# Patient Record
Sex: MALE | Race: WHITE | NOT HISPANIC OR LATINO | URBAN - METROPOLITAN AREA
[De-identification: names, ages, dates, MRNs, and addresses within clinical notes are randomized per-mention and may not be internally consistent; named-entity substitution may affect disease eponyms.]

---

## 2022-03-09 ENCOUNTER — EMERGENCY (EMERGENCY)
Facility: HOSPITAL | Age: 67
LOS: 1 days | Discharge: HOME | End: 2022-03-09
Attending: EMERGENCY MEDICINE | Admitting: EMERGENCY MEDICINE
Payer: MEDICARE

## 2022-03-09 VITALS
HEART RATE: 117 BPM | HEIGHT: 73 IN | DIASTOLIC BLOOD PRESSURE: 78 MMHG | RESPIRATION RATE: 16 BRPM | OXYGEN SATURATION: 97 % | SYSTOLIC BLOOD PRESSURE: 126 MMHG | TEMPERATURE: 98 F | WEIGHT: 266.98 LBS

## 2022-03-09 VITALS
DIASTOLIC BLOOD PRESSURE: 77 MMHG | TEMPERATURE: 98 F | HEART RATE: 100 BPM | RESPIRATION RATE: 18 BRPM | OXYGEN SATURATION: 96 % | SYSTOLIC BLOOD PRESSURE: 113 MMHG
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
## 2022-03-09 DIAGNOSIS — S09.90XA UNSPECIFIED INJURY OF HEAD, INITIAL ENCOUNTER: ICD-10-CM

## 2022-03-09 DIAGNOSIS — Z79.01 LONG TERM (CURRENT) USE OF ANTICOAGULANTS: ICD-10-CM
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
## 2022-03-09 PROCEDURE — 70450 CT HEAD/BRAIN W/O DYE: CPT | Mod: MA

## 2022-03-09 PROCEDURE — 70450 CT HEAD/BRAIN W/O DYE: CPT | Mod: 26,MA

## 2022-03-09 PROCEDURE — 99284 EMERGENCY DEPT VISIT MOD MDM: CPT | Mod: 25

## 2022-03-09 PROCEDURE — 99284 EMERGENCY DEPT VISIT MOD MDM: CPT

## 2022-03-09 NOTE — ED ADULT NURSE NOTE - OBJECTIVE STATEMENT
Presents from  for head CT- notes hit head on car door yesterday and today noticed Presents from  for head CT- notes hit head on car door yesterday and today noticed lac on top of head while at [place]. Denies LOC/weakness/dizziness/change in [place] pre or post incident. Denies CP/SOB/tingling/cough/fevers/known sick contacts.    On assessment- AOx4, breathing even and unlabored on RA, no apparent distress, VSS in triage, able to speak in clear coherent sentences, steady gait unassisted, neuro intact with no apparent facial asymmetry, PERRLA. 10cm lac to front top of head without active bleeding.

## 2022-03-09 NOTE — ED PROVIDER NOTE - OBJECTIVE STATEMENT
65 y/o M pt on Eliquis presents to ED reporting that he bumped his head on his car trunk as he was opening it, sustaining an abrasion yesterday. Pt heard a bump at the time, but denies any significant pain or headache, dizziness, nausea, vomiting, neck pain, back pain, or any other acute complaints. He came to ED today after going to urgent care.

## 2022-03-09 NOTE — ED ADULT TRIAGE NOTE - CHIEF COMPLAINT QUOTE
Patient states he was recommended to ED from urgent care for CT of the head.  Patient states yesterday he hit his head on the trunk yesterday, did not realize there was any injury until today when he was getting a hair cut.  Bacitracin applied to lac on top of head.  Takes Eliquis daily.

## 2022-03-09 NOTE — ED PROVIDER NOTE - MUSCULOSKELETAL, MLM
Spine appears normal, range of motion is not limited, no muscle or joint tenderness. Long, shallow, linear abrasion at top of head, no bleeding.

## 2022-03-09 NOTE — ED PROVIDER NOTE - PATIENT PORTAL LINK FT
You can access the FollowMyHealth Patient Portal offered by [facility] by registering at the following website: [email or web address]. By joining [employer]’s FollowMyHealth portal, you will also be able to view your health information using other applications (apps) compatible with our system.

## 2022-03-09 NOTE — ED PROVIDER NOTE - CLINICAL SUMMARY MEDICAL DECISION MAKING FREE TEXT BOX
65 y/o M pt presents to ED with minor head trauma, but is on Eliquis. Plan for CT r/o ICH. Abrasion is minor, no lacerations, and no need for sutures or wound care. Will discuss emergent return instructions.

## 2022-03-09 NOTE — ED PROVIDER NOTE - SKIN, MLM
Skin normal color for race, warm, dry and intact. No evidence of rash. Skin normal color for race, warm, dry and intact. No evidence of rash. small abrasion across top of scalp

## 2024-01-22 NOTE — ED ADULT NURSE NOTE - CAS TRG GENERAL AIRWAY, MLM
Med:  [facility]  -  Last filled on 12/01/2023 for 90 mL with 1 refills    LOV:  11/17/2023    FUV:  06/24/2024    Script has been sent to ordering provider JULIA Sullivan      
Patent